# Patient Record
Sex: MALE | Race: WHITE
[De-identification: names, ages, dates, MRNs, and addresses within clinical notes are randomized per-mention and may not be internally consistent; named-entity substitution may affect disease eponyms.]

---

## 2017-03-29 ENCOUNTER — HOSPITAL ENCOUNTER (OUTPATIENT)
Dept: HOSPITAL 80 - FIMAGING | Age: 62
End: 2017-03-29
Attending: INTERNAL MEDICINE
Payer: COMMERCIAL

## 2017-03-29 DIAGNOSIS — R10.11: Primary | ICD-10-CM

## 2017-03-29 PROCEDURE — A9537 TC99M MEBROFENIN: HCPCS

## 2017-03-29 PROCEDURE — 78227 HEPATOBIL SYST IMAGE W/DRUG: CPT

## 2019-02-15 ENCOUNTER — HOSPITAL ENCOUNTER (EMERGENCY)
Dept: HOSPITAL 80 - FED | Age: 64
Discharge: HOME | End: 2019-02-15
Payer: COMMERCIAL

## 2019-02-15 VITALS — DIASTOLIC BLOOD PRESSURE: 70 MMHG | SYSTOLIC BLOOD PRESSURE: 106 MMHG

## 2019-02-15 DIAGNOSIS — T18.9XXA: Primary | ICD-10-CM
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
## 2019-02-15 NOTE — EDPHY
H & P


Time Seen by Provider: 02/15/19 20:44


HPI/ROS: 





CHIEF COMPLAINT:  Accidentally swallowed dental bridge





HISTORY OF PRESENT ILLNESS:  Has an upper temporary dental bridge about 4-5 

teeth wide and was chewing at tonight and then thinks he accidentally swallowed 

it.  Was eating pizza at the time about an hour prior to arrival.  He feels 

like it is lodged in the right side of his throat in his neck.  No coughing or 

choking.  No trouble breathing.  No vomiting.  Object does not have sharp edges.





REVIEW OF SYSTEMS:


Eye: no change in vision


ENT:  HPI


Cardiac: no chest pain or syncope


Pulmonary: no cough or SOB


Abdomen: no vomiting, diarrhea, abdominal pain


Musculoskeletal: no back pain


Skin: no rash


Neuro: no headache


Constitutional: no fever


: no urinary symptoms





A comprehensive 10 point review of systems is otherwise negative aside from 

elements mentioned in the history of present illness.





PAST MEDICAL HISTORY:  Hypertension, bipolar, high cholesterol, head neck cancer





Social history:  Nonsmoker





General Appearance: Alert and conversant, cooperative.


Eyes: No scleral  icterus. 


ENT, Mouth: Normal mucous membranes.  Normal pharynx without trismus or 

angioedema or swelling.


Respiratory: Normal respiratory effort, breath sounds equal, lungs are clear to 

auscultation.  No stridor or drooling.


Cardiovascular:  Regular rate and rhythm.


Gastrointestinal:  Abdomen is soft and non tender.


Neurological:  Alert and ambulatory.


Skin: Warm and dry, no rashes.


Musculoskeletal:  Normal range of motion of the neck.


Psychiatric: Not agitated.





Emergency Department course/MDM:





2130:  Not able to visualize on soft tissue neck or chest x-ray.  Discussed 

with Dr. Pichardo.  CT noncontrast neck discussed and consented.





2146:  Not seen on CT discussed with Dr. Pichardo.


Patient does not have coughing or wheezing or stridor or feeling of esophageal 

foreign body.  Likely at scratched him on the way down and is now in his 

stomach.


Explained the patient will likely pass in his stool, there is a small 

possibility will not.


Return for gastrointestinal symptoms, GI referral.


Discussed with Fredo 2155; will facilitate followup if needed.


Smoking Status: Never smoked


Constitutional: 


 Initial Vital Signs











Temperature (C)  36.7 C   02/15/19 20:33


 


Heart Rate  90   02/15/19 20:33


 


Respiratory Rate  18   02/15/19 20:33


 


Blood Pressure  122/86 H  02/15/19 20:33


 


O2 Sat (%)  93   02/15/19 20:33








 











O2 Delivery Mode               Room Air














Allergies/Adverse Reactions: 


 





No Known Allergies Allergy (Verified 02/15/19 20:32)


 








Home Medications: 














 Medication  Instructions  Recorded


 


Abilify  06/24/14


 


Aspirin  06/24/14


 


Clonazepam  06/24/14


 


LaMICtal  06/24/14


 


Levothyroxine  06/24/14


 


Lipitor 10 mg (RX)  06/24/14


 


Lisinopril  06/24/14


 


Trileptal  06/24/14


 


Tamsulosin HCl  09/06/14














Medical Decision Making





- Diagnostics


Imaging Results: 


 Imaging Impressions





Soft Tissue Neck X-Ray  02/15/19 20:50


Impression:


1.  A dental bridge is not identified.


2.  See above report for additional findings.








Chest X-Ray  02/15/19 21:12


Impression:


1. Negative for radiopaque foreign body.


2. Suspect airways disease.








Neck CT  02/15/19 21:27


Impression:  A radiopaque foreign body is not identified.


 


Results called and discussed with Donnie Rashid M.D., on February 15, 2019 at 

2149.











Imaging: Discussed imaging studies w/ On call Radiologist





Departure





- Departure


Disposition: Home, Routine, Self-Care


Clinical Impression: 


 Swallowed foreign body





Condition: Good


Instructions:  Foreign Body Ingestion (ED)


Additional Instructions: 


Please return if you get fever or chills or abdominal pain or vomiting.


If you have any did abdominal discomfort next week you should follow up with 

Dr. Julio from Gastroenterology.


Referrals: 


Laila Yoo MD [Primary Care Provider] - As per Instructions


Dandre Julio MD [Medical Doctor] - As per Instructions